# Patient Record
Sex: FEMALE | Race: WHITE | NOT HISPANIC OR LATINO | URBAN - METROPOLITAN AREA
[De-identification: names, ages, dates, MRNs, and addresses within clinical notes are randomized per-mention and may not be internally consistent; named-entity substitution may affect disease eponyms.]

---

## 2022-08-16 ENCOUNTER — OFFICE (OUTPATIENT)
Dept: URBAN - METROPOLITAN AREA CLINIC 34 | Facility: CLINIC | Age: 52
End: 2022-08-16

## 2022-08-16 VITALS
HEIGHT: 64 IN | DIASTOLIC BLOOD PRESSURE: 88 MMHG | HEART RATE: 97 BPM | TEMPERATURE: 98.2 F | SYSTOLIC BLOOD PRESSURE: 136 MMHG | WEIGHT: 176 LBS

## 2022-08-16 DIAGNOSIS — K21.00 GASTRO-ESOPHAGEAL REFLUX DISEASE WITH ESOPHAGITIS, WITHOUT B: ICD-10-CM

## 2022-08-16 DIAGNOSIS — K59.09 OTHER CONSTIPATION: ICD-10-CM

## 2022-08-16 PROCEDURE — 99204 OFFICE O/P NEW MOD 45 MIN: CPT | Performed by: INTERNAL MEDICINE

## 2022-08-16 PROCEDURE — 00031: CPT | Performed by: INTERNAL MEDICINE

## 2022-08-16 NOTE — SERVICENOTES
I personally discussed the procedures with the patient, including the risks, benefits, and alternatives of EGD and Colonoscopy. The procedures will be done under pharmacologic sedation. Risks and potential complications of the procedures include, but not limited to: bleeding, infection, bowel perforation, adverse reaction to anesthetics and missed lesions. Biopsy, dilation, polypectomy and/or maneuvers to control bleeding may be performed. Other alternative diagnostic or therapeutic procedures, such as medication treatment, x-ray, and surgery may be available. Another option is to choose no diagnostic exam and/or treatment. Discussed the need for low fiber diet for a week before procedure and clear liquid diet the day before procedure. 
Discussed prep with Dulcolax or Miralax with Gatorade. Patient expressed understanding of all these risks and was given the opportunity to ask questions, which I answered to the patient’s satisfaction. The patient agreed to the procedures.

## 2022-08-16 NOTE — SERVICEHPINOTES
52yoF referred for evaluation of constipation and reflux. br New onset constipation for the past 2-3 months. She goes 2-3 days w/o BM and has small amount of BMs. No bloody or black stools. Prior to this she had BM daily at 1-2 times a day. Now needs to take Miralax once daily and able to go daily br She has baseline reflux sx that have gotten worse
br She has epigastric fullness and night time regurgitation.
brHad to take Zantac in the past and uses tums now. No prior PPI.br No dysphagia but feels a sensation of food sitting in her stomach longer than it should
br No nausea or emesis. No weight loss.
brShe eats dinner at 7:30 and goes to bed at 10:30 but eats late night snack in between.brNo sensitivity to gluten or dairy.br She started a low calorie diet about a year ago but no changes recently.
brS/p CCY in ,  in .
br
br Takes occasional Ibuprofen for arthritis. No OTC supplements etc.br brNo family history of CRC, polyps, IBD or Celiac disease.
br No EGD, colonoscopy or imaging in the past..
br
br No cardiovascular or pulmonary issues. No anti-plt or anticoagulation. brAll 10 point review of systems have been reviewed as per HPI and otherwise negative. 
br 
br br
br
br

## 2022-09-21 ENCOUNTER — OFFICE (OUTPATIENT)
Dept: URBAN - METROPOLITAN AREA CLINIC 98 | Facility: CLINIC | Age: 52
End: 2022-09-21

## 2022-09-21 ENCOUNTER — OFFICE (OUTPATIENT)
Dept: URBAN - METROPOLITAN AREA PATHOLOGY 18 | Facility: PATHOLOGY | Age: 52
End: 2022-09-21

## 2022-09-21 VITALS
SYSTOLIC BLOOD PRESSURE: 120 MMHG | HEART RATE: 88 BPM | HEART RATE: 33 BPM | RESPIRATION RATE: 15 BRPM | TEMPERATURE: 97.3 F | SYSTOLIC BLOOD PRESSURE: 130 MMHG | SYSTOLIC BLOOD PRESSURE: 139 MMHG | DIASTOLIC BLOOD PRESSURE: 92 MMHG | RESPIRATION RATE: 17 BRPM | OXYGEN SATURATION: 99 % | OXYGEN SATURATION: 98 % | SYSTOLIC BLOOD PRESSURE: 116 MMHG | HEART RATE: 79 BPM | WEIGHT: 176 LBS | HEART RATE: 89 BPM | HEART RATE: 86 BPM | DIASTOLIC BLOOD PRESSURE: 67 MMHG | HEIGHT: 64 IN | OXYGEN SATURATION: 100 % | DIASTOLIC BLOOD PRESSURE: 61 MMHG | RESPIRATION RATE: 14 BRPM | DIASTOLIC BLOOD PRESSURE: 84 MMHG | RESPIRATION RATE: 16 BRPM | TEMPERATURE: 98.3 F | DIASTOLIC BLOOD PRESSURE: 77 MMHG | SYSTOLIC BLOOD PRESSURE: 134 MMHG | HEART RATE: 83 BPM | SYSTOLIC BLOOD PRESSURE: 100 MMHG | SYSTOLIC BLOOD PRESSURE: 128 MMHG | HEART RATE: 77 BPM

## 2022-09-21 DIAGNOSIS — K29.60 OTHER GASTRITIS WITHOUT BLEEDING: ICD-10-CM

## 2022-09-21 DIAGNOSIS — K59.09 OTHER CONSTIPATION: ICD-10-CM

## 2022-09-21 DIAGNOSIS — K21.00 GASTRO-ESOPHAGEAL REFLUX DISEASE WITH ESOPHAGITIS, WITHOUT B: ICD-10-CM

## 2022-09-21 PROCEDURE — 88305 TISSUE EXAM BY PATHOLOGIST: CPT | Performed by: PATHOLOGY

## 2022-09-21 PROCEDURE — 88312 SPECIAL STAINS GROUP 1: CPT | Performed by: PATHOLOGY

## 2022-09-21 PROCEDURE — 88342 IMHCHEM/IMCYTCHM 1ST ANTB: CPT | Performed by: PATHOLOGY

## 2022-09-21 PROCEDURE — 88313 SPECIAL STAINS GROUP 2: CPT | Performed by: PATHOLOGY

## 2022-10-27 ENCOUNTER — TELEHEALTH PROVIDED IN PATIENT'S HOME (OUTPATIENT)
Dept: URBAN - METROPOLITAN AREA TELEHEALTH 3 | Facility: TELEHEALTH | Age: 52
End: 2022-10-27

## 2022-10-27 VITALS — HEIGHT: 64 IN | WEIGHT: 175 LBS

## 2022-10-27 DIAGNOSIS — K21.00 GASTRO-ESOPHAGEAL REFLUX DISEASE WITH ESOPHAGITIS, WITHOUT B: ICD-10-CM

## 2022-10-27 DIAGNOSIS — K59.09 OTHER CONSTIPATION: ICD-10-CM

## 2022-10-27 PROCEDURE — 99214 OFFICE O/P EST MOD 30 MIN: CPT | Mod: 95

## 2022-10-27 RX ORDER — PANTOPRAZOLE SODIUM 40 MG/1
TABLET, DELAYED RELEASE ORAL
Qty: 30 | Refills: 2 | Status: ACTIVE
Start: 2022-10-27

## 2022-10-27 NOTE — SERVICEHPINOTES
PATIENT VERIFIED BY DATE OF BIRTH AND NAME. Patient has been consented for this telecommunication visit.
rishabh keating 
53 y/o female following up after EGD/colonoscopy for worsening reflux and new onset constipation.rishabh keating [address] Toro. New onset constipation for the past 2-3 months. She goes 2-3 days w/o BM and has small amount of BMs. No bloody or black stools. Prior to this she had BM daily at 1-2 times a day. Now needs to take Miralax once daily and able to go daily. She has baseline reflux sx that have gotten worse. She has epigastric fullness and night time regurgitation. Had to take Zantac in the past and uses tums now. No prior PPI. No dysphagia but feels a sensation of food sitting in her stomach longer than it should. No nausea or emesis. No weight loss. She eats dinner at 7:30 and goes to bed at 10:30 but eats late night snack in between. No sensitivity to gluten or dairy. She started a low calorie diet about a year ago but no changes recently. S/p CCY in ,  in .brTakes occasional Ibuprofen for arthritis. No OTC supplements etc.brNo family history of CRC, polyps, IBD or Celiac disease.
rishabh keating --2022 EGD: esophagus Bx normal. mild to mod chronic gastritis, no h. pylori. duodenum Bx normal.br --2022 Colon: internal hemorrhoids, o/w normal. R/c 10 yrs.rishabh keating Today she reports her sx are persisting. She took something over the counter for reflux but it did not help. She is moving bowels daily goes 3-4x/day but stools are hard and small +incomplete evacuation. She felt better while on Miralax stopped as she wasn't sure if safe to take for long periods.
rishabh Sam 10 point review of systems have been reviewed as per HPI and otherwise negative.